# Patient Record
(demographics unavailable — no encounter records)

---

## 2024-10-14 NOTE — PHYSICAL EXAM
[de-identified] : WDWN adult female. Alert and oriented.  is present. Left arm in a shoulder immobilizer. Postop dressing was removed. Edema and ecchymosis present. Wound is healed. No sign of infection. Passive flexion 90 degrees.  Elbow motion is 15 to 130 degrees.  Left hand FROM  Sensation is normal.   [de-identified] :  AP, lateral, and oblique views of the left shoulder were obtained today 10/14/24 and revealed  a well aligned humeral neck fracture. Plate and screws in place.

## 2024-10-14 NOTE — ADDENDUM
[FreeTextEntry1] : This note was written by Dwayne Mendiola on 10/14/2024 actively solely AMY Vences M.D.     All medical record entries made by the Scribe were at my, AMY Vences M.D. direction and personally dictated by me on 10/14/2024. I have personally reviewed the chart and agree that the record reflects my personal performance of the history, physical exam, assessment, and plan.

## 2024-10-14 NOTE — DISCUSSION/SUMMARY
[de-identified] : The underlying pathophysiology was reviewed with the patient. XR films were reviewed with the patient. Discussed at length the nature of the patient's condition. NSAIDs as tolerated.  Left proximal humerus healing well and dressing removed. Patient is advised to take calcium citrate, vitamin D and vitamin C. Advised to remove sling at home and to practice ROM exercises. Advised to apply vitamin E oil to help scar fade.  Patient should follow up in 2 weeks.

## 2024-10-14 NOTE — HISTORY OF PRESENT ILLNESS
[de-identified] : Pt is a RHD 73 y/o female with left proximal humerus fracture.  She was walking her daughter's dog on the beach in [facility] on Saturday 9/21/24 and he saw another dog.  He pulled her and she landed on her left shoulder.  She was taken via ambulance to the local ED where x-rays and CT scan revealed a left displaced proximal humerus fracture.  Patient taking cholesterol and thyroid medication for precaution, otherwise no medical problems. Patient takes Tylenol for pain and does not take Ibuprofen because of stomach issues and nose bleeds. She subsequently consented to left shoulder surgery.   Today 10/14/2024 patient presents for postop visit followings ORIF left proximal humerus. DOS 10/02/2024 at [facility].  She denies pain at this time.

## 2024-12-19 NOTE — HISTORY OF PRESENT ILLNESS
[de-identified] : The patient is a 74 year female who returns for the 1st postoperative visit after undergoing Open reduction and internal fixation of left proximal humerus with 45 cc of cancellous allograft chips and repair of rotator cuff.at [facility]. The surgery was on 10/02/2024. The patient is recovering at home. She reports mild postoperative pain.

## 2024-12-19 NOTE — ADDENDUM
[FreeTextEntry1] : I, Yan Hernandez Jr, acted solely as a scribe for Dr. Cody Aguirre on this date 12/19/2024  All medical record entries made by the Scribe were at my, Dr. Cody Aguirre, direction and personally dictated by me on 12/19/2024 . I have reviewed the chart and agree that the record accurately reflects my personal performance of the history, physical exam, assessment and plan. I have also personally directed, reviewed, and agreed with the chart.

## 2024-12-19 NOTE — PHYSICAL EXAM
[de-identified] : Patient is WDWN, alert, and in no acute distress. Breathing is unlabored. She is grossly oriented to person, place, and time.  Left Shoulder: Incision is healing well. There are no signs of infection. Normal amount of postoperative edema and tenderness present.  [de-identified] : AP, lateral and oblique views of the LEFT shoulder were obtained today and revealed well aligned fully healed left proximal humerus fracture

## 2024-12-19 NOTE — DISCUSSION/SUMMARY
[de-identified] : The underlying pathophysiology was reviewed with the patient. XR films were reviewed with the patient. Discussed at length the nature of the patients condition. The patient appears to be doing well following her left shoulder ORIF.   Patient was advised to take OTC medications and topical analgesic for pain management. I advised that the patient should continue their current regimen if she has found some alleviation to her symptoms. The patient should continue to remain active and exercise regularly. Gentle range of motion and strengthening exercises were encouraged, as tolerated by pain.  All questions answered, understanding verbalized. Patient in agreement with plan of care.  If the patient begins to experience any changes or severe exacerbation of her symptoms, she should reach out to me as soon as possible. Otherwise, she should return to me as needed.

## 2025-06-19 NOTE — DISCUSSION/SUMMARY
[de-identified] : The underlying pathophysiology was reviewed with the patient. XR films were reviewed with the patient. Discussed at length the nature of the patients condition. She is s/p ORIF left proximal humerus with 45 cc of cancellous allograft chips and repair of rotator cuff.  The patient appears to be doing well following her left shoulder ORIF. Maximum recovery will take up to one year.  I advised that the patient should continue their current regimen if she has found some alleviation to her symptoms. The patient should continue to remain active and exercise regularly. Gentle range of motion and strengthening exercises were encouraged, as tolerated by pain.  Regarding her neck, I informed her the symptoms should not be from her injury or surgery. Patient was advised to try OTC medication and topical analgesics for pain management. I recommend that the patient utilize Tylenol, Advil, Aleve, Voltaren gel, Icy Hot, Biofreeze, Bengay, or 4% lidocaine patches.  All questions answered, understanding verbalized. Patient in agreement with plan of care. Patient is advised to follow-up as needed.  If the patient begins to experience any changes or severe exacerbation of her symptoms, she should reach out to me as soon as possible.

## 2025-06-19 NOTE — HISTORY OF PRESENT ILLNESS
[de-identified] : The patient is a 74 year female who returns for the 2nd postoperative visit after undergoing Open reduction and internal fixation of left proximal humerus with 45 cc of cancellous allograft chips and repair of rotator cuff.at [facility]. The surgery was on 10/02/2024. The patient is recovering at home. She reports mild postoperative pain.   Today, 06/19/2025, the patient presents for a follow-up evaluation and further care. She has no complaints with her arm and full ROM. She does have some pain with her neck.

## 2025-06-19 NOTE — ADDENDUM
[FreeTextEntry1] : I, Guillaume Ray wrote this note acting as a scribe for Dr. Cody Aguirre on 06/19/2025.   All medical record entries made by the Scribe were at my, Dr. Cody Aguirre MD., direction and personally dictated by me on 06/19/2025. I have personally reviewed the chart and agree that the record accurately reflects my personal performance of the history, physical exam, assessment and plan.
